# Patient Record
Sex: MALE | Race: WHITE | URBAN - METROPOLITAN AREA
[De-identification: names, ages, dates, MRNs, and addresses within clinical notes are randomized per-mention and may not be internally consistent; named-entity substitution may affect disease eponyms.]

---

## 2021-01-18 ENCOUNTER — HISTORICAL (OUTPATIENT)
Dept: ADMINISTRATIVE | Facility: HOSPITAL | Age: 51
End: 2021-01-18

## 2021-11-21 ENCOUNTER — HOSPITAL ENCOUNTER (OUTPATIENT)
Dept: NUTRITION | Facility: HOSPITAL | Age: 51
End: 2021-11-24
Attending: INTERNAL MEDICINE | Admitting: INTERNAL MEDICINE

## 2022-04-08 ENCOUNTER — HISTORICAL (OUTPATIENT)
Dept: ADMINISTRATIVE | Facility: HOSPITAL | Age: 52
End: 2022-04-08

## 2022-04-09 ENCOUNTER — HISTORICAL (OUTPATIENT)
Dept: ADMINISTRATIVE | Facility: HOSPITAL | Age: 52
End: 2022-04-09
Payer: MEDICAID

## 2022-04-25 VITALS — WEIGHT: 216.5 LBS | BODY MASS INDEX: 33.98 KG/M2 | HEIGHT: 67 IN

## 2022-04-30 NOTE — ED PROVIDER NOTES
Patient:   Jayant Garcia            MRN: [id number]            FIN: [id number]               Age:   50 years     Sex:  Male     :  1970   Associated Diagnoses:   Alcohol withdrawal; Acute epigastric pain   Author:   Gini Magana DO      Basic Information   Time seen: Date & time 2021 17:35:00.   History source: Patient, EMS.   Arrival mode: Ambulance.   History limitation: None.   Additional information: Chief Complaint from Nursing Triage Note : Chief Complaint   2021 17:29 CST     Chief Complaint           acute onset CP. worse with deep breathing and movement. +ETOH  .      History of Present Illness   The patient presents with 51 y/o M presents to the ED via EMS for pleuritic chest pain. EMS states patient had a pint of vodka to drink today, denies drug use. Patient tearful in triage, asking to be placed in rehab center for etoh use. Denies SI/HI, AH/VH. TARYN Jimenez and     I, Dr Magana, assumed care of this pt at 1756.    51 y/o male with a history of alcohol abuse presenting to the ED with epigastric pain and nausea. No vomiting, fever, diarrhea or seizures.  Pt states he does not drink everyday but has been binging lately bc he is getting .. Pt admits that he drank too much and needs to go to rehab. He is concerned he is detoxing since the alcohol is starting to wear off. .  The course/duration of symptoms is constant.  Location: epigastric. Radiating pain: none. The character of symptoms is pleuritic.  The degree at onset was moderate.  The degree at maximum was moderate.  The degree at present is moderate.  The exacerbating factor is none.  The relieving factor is none.  Prior episodes: none.  Therapy today None.  Associated symptoms: none.        Review of Systems   Constitutional symptoms:  No fever, no chills   Skin symptoms:     Respiratory symptoms:  No shortness of breath   Cardiovascular symptoms:  No chest pain, no syncope   Gastrointestinal symptoms:   EpigastricNo nausea, no vomiting   Genitourinary symptoms:               Additional review of systems information: All other systems reviewed and otherwise negative.      Health Status   Allergies:    Allergic Reactions (Selected)  Severity Not Documented  Penicillin- Hives..   Medications:  (Selected)   Inpatient Medications  Ordered  NS (0.9% Sodium Chloride) Infusion: 1,000 mL, 1,000 mL, IV, Once, 1000 mL/hr, start date 11/21/21 18:04:00 CST, stop date 11/21/21 18:04:00 CST, STAT  Protonix 40 mg Vial (IV Push): 40 mg, form: Injection, IV Slow, Once, Infuse over: 2 minute(s), first dose 11/21/21 18:04:00 CST, stop date 11/21/21 18:04:00 CST, STAT  Zofran 2 mg/mL injectable solution: 4 mg, form: Injection, IV Push, Once, first dose 11/21/21 18:04:00 CST, stop date 11/21/21 18:04:00 CST, STAT  Prescriptions  Prescribed  Protonix 40 mg ORAL enteric coated tablet: 40 mg = 1 tab(s), Oral, Daily, # 30 tab(s), 0 Refill(s)  Vistaril 25 mg oral capsule: 25 mg = 1 cap(s), Oral, TID, PRN PRN for anxiety, X 14 day(s), # 30 cap(s), 0 Refill(s)  citalopram 20 mg oral tablet: 20 mg = 1 tab(s), Oral, Daily, # 30 tab(s), 0 Refill(s)  naltrexone 50 mg oral tablet: 50 mg = 1 tab(s), Oral, Daily, X 30 day(s), # 30 tab(s), 0 Refill(s)  Documented Medications  Documented  QUEtiapine 100 mg oral tablet: 100 mg = 1 tab(s), Oral, qPM  buPROPion 300 mg/24 hours (XL) oral tablet, extended release: 300 mg = 1 tab(s), Oral, Daily  ergocalciferol 50,000 intl units (1.25 mg) oral capsule: 75211 IntUnit = 1 cap(s), Oral, qWeek  folic acid 1 mg oral tablet: 1 mg = 1 tab(s), Oral, Daily  hydrOXYzine pamoate 25 mg oral capsule: 25 mg = 1 cap(s), Oral, QID  omeprazole 20 mg oral DR capsule: 20 mg = 1 cap(s), Oral, Daily  prazosin 1 mg oral capsule: 1 mg = 1 cap(s), Oral, qPM  traZODONE 50 mg oral tablet ( Desyrel ): 100 mg = 2 tab(s), Oral, qPM.      Past Medical/ Family/ Social History   Medical history:   Alcohol abuse   Alcohol abuse   Chronic  alcohol abuse   Obesity    .   Surgical history:    Right leg (SNOMED CT [id number])..   Family history:    No family history items have been selected or recorded..   Social history: Alcohol use: Regularly, history of alcohol abuse, Tobacco use: Denies, Drug use: Denies.      Physical Examination               Vital Signs   Vital Signs   11/21/2021 17:45 CST     Peripheral Pulse Rate     110 bpm  HI                             Respiratory Rate          20 br/min                             SpO2                      98 %                             Oxygen Therapy            Room air                             Systolic Blood Pressure   129 mmHg                             Diastolic Blood Pressure  91 mmHg  HI                             Mean Arterial Pressure, Cuff              104 mmHg    11/21/2021 17:29 CST     Temperature Temporal Artery               36.8 DegC  .   Measurements   11/21/2021 17:29 CST     Weight Dosing             100 kg                             Weight Measured and Calculated in Lbs     220.46 lb                             Weight Estimated          100 kg                             Height/Length Dosing      170.18 cm                             Height/Length Estimated   170.18 cm                             Body Mass Index Estimated 34.53 kg/m2  .   Basic Oxygen Information   11/21/2021 17:45 CST     SpO2                      98 %                             Oxygen Therapy            Room air  .   General:  Alert, anxious, Smells of alcohol   Max coma scale:  Eye response: 4 /4, verbal response: 5 /5, motor response: 6 /6, Total score: Total score: 15.    Neurological:  No focal neurological deficit observed, CN II-XII intact, Cognitive function: To person, to place, to situation.    Skin:  Warm, no pallor   Head:  Normocephalic, atraumatic   Neck:  Supple, trachea midline.    Eye:  Pupils are equal, round and reactive to light, extraocular movements are intact, Injected conjunctiva    Ears, nose, mouth and throat:  External ear: Bilateral, normal, Nose: Bilateral nares, normal, Mouth: Dry oral mucosa.    Cardiovascular:  Normal peripheral perfusion, Tachycardic with regular rhythm   Respiratory:  Lungs are clear to auscultation, respirations are non-labored   Gastrointestinal:  Soft, Non distended, Tenderness: Mild, epigastric.    Musculoskeletal:  Normal ROM.   Psychiatric:  Cooperative, appropriate mood & affect.       Medical Decision Making   Documents reviewed:  Emergency department nurses' notes, prior records, Frequent ED visits for alcohol intoxication.    Orders  Launch Order Profile (Selected)   Inpatient Orders  Ordered (Exam Completed)  CXR 1 View: Stat, 21 17:37:00 CST, Shortness of Breath, None, Stretcher, Rad Type, Not Scheduled, 21 17:37:00 CST  Ordered (Exam Ordered)  US Abdomen Limited: Stat, 21 18:49:00 CST, Epigastric Pain, None, Stretcher, Rad Type, Schedule this test, 21 18:49:00 CST  Completed  Automated Diff: STAT collect, 21 17:57:00 CST, Blood, Collected, Stop date 21 17:57:00 CST, Lab Collect, Print Label By Order Location, 21 17:37:00 CST  CBC w/ Auto Diff: STAT collect, 21 17:57:41 CST, BLOOD, Collected, Stop date 21 17:57:00 CST, Lab Collect  CMP: STAT collect, 21 17:57:41 CST, BLOOD, Collected, Stop date 21 17:57:00 CST, Lab Collect  EK21 17:37:00 CST, Stat, Once, 21 17:37:00 CST, -1, -1, 21 17:37:00 CST  Estimated Glomerular Filtration Rate: STAT collect, 21 17:57:00 CST, Blood, Collected, Stop date 21 17:57:00 CST, Lab Collect, Print Label By Order Location, 21 17:37:00 CST  Lipase Level: Stat collect, 21 18:04:00 CST, Blood, Stop date 21 18:04:00 CST, Lab Collect, Print Label By Order Location, 21 18:04:00 CST  NS (0.9% Sodium Chloride) Infusion: 1,000 mL, 1,000 mL, IV, Once, 1000 mL/hr, start date 21 18:04:00 CST, stop date 21  18:04:00 CST, STAT  Protonix 40 mg Vial (IV Push): 40 mg, form: Injection, IV Slow, Once, Infuse over: 2 minute(s), first dose 11/21/21 18:04:00 CST, stop date 11/21/21 18:04:00 CST, STAT  Troponin-I: STAT collect, 11/21/21 17:57:41 CST, BLOOD, Collected, Stop date 11/21/21 17:57:00 CST, Lab Collect  Zofran 2 mg/mL injectable solution: 4 mg, form: Injection, IV Push, Once, first dose 11/21/21 18:04:00 CST, stop date 11/21/21 18:04:00 CST, STAT.   Electrocardiogram:  Time 11/21/2021 17:29:00, rate 123, no ectopy, normal WA & QRS intervals, EP Interp, The Rhythm is sinus tachycardia.  , T wave Inversion, II, , III, , AVF.    Results review:  Lab results : Lab View   11/21/2021 18:33 CST     Est Creat Clearance Ser   97.21 mL/min    11/21/2021 17:57 CST     Sodium Lvl                138 mmol/L                             Potassium Lvl             4.1 mmol/L                             Chloride                  97 mmol/L  LOW                             CO2                       26 mmol/L                             Calcium Lvl               8.5 mg/dL  LOW                             Glucose Lvl               101 mg/dL  HI                             BUN                       14.0 mg/dL                             Creatinine                0.85 mg/dL                             eGFR-AA                   >60  NA                             eGFR-STEVEN                  >60 mL/min/1.73 m2  NA                             Bili Total                1.4 mg/dL                             Bili Direct               0.6 mg/dL  HI                             Bili Indirect             0.80 mg/dL                             AST                       239 unit/L  HI                             ALT                       131 unit/L  HI                             Alk Phos                  92 unit/L                             Total Protein             8.1 gm/dL                             Albumin Lvl               3.9 gm/dL                              Globulin                  4.2 gm/dL  HI                             A/G Ratio                 0.9 ratio  LOW                             Lipase Lvl                88 U/L  HI                             Troponin-I                0.009 ng/mL                             WBC                       10.1 x10(3)/mcL                             RBC                       4.42 x10(6)/mcL  LOW                             Hgb                       13.6 gm/dL  LOW                             Hct                       38.9 %  LOW                             Platelet                  263 x10(3)/mcL                             MCV                       88.0 fL                             MCH                       30.8 pg                             MCHC                      35.0 gm/dL                             RDW                       14.5 %                             MPV                       8.9 fL  LOW                             Abs Neut                  6.48 x10(3)/mcL                             Neutro Auto               64 %  NA                             Lymph Auto                27 %  NA                             Mono Auto                 8 %  NA                             Eos Auto                  0 %  NA                             Abs Eos                   0.0 x10(3)/mcL                             Basophil Auto             1 %  NA                             Abs Neutro                6.48 x10(3)/mcL                             Abs Lymph                 2.8 x10(3)/mcL                             Abs Mono                  0.8 x10(3)/mcL                             Abs Baso                  0.1 x10(3)/mcL  .   Chest X-Ray:  No acute disease process, interpretation by Emergency Physician.    Notes:  50-year-old male presenting with epigastric pain and nausea, admits drinking a large amount.  Labs with mild transaminitis and lipase mildly elevated.  Ultrasound of the right upper quadrant showed enlarged  liver but normal gallbladder wall and normal common bile duct.  Patient was given Zofran and IV fluids.  While in the emergency department patient became more anxious, heart rate became more elevated he became diaphoretic and was concerned for withdrawal. CIWA 12, started on Serax protocol and admitted to hospitalist .      Reexamination/ Reevaluation   Vital signs   Basic Oxygen Information   11/21/2021 17:29 CST     SpO2                      98 %                             Oxygen Therapy            Room air        Impression and Plan   Diagnosis   Alcohol withdrawal ([id number] F10.239)   Acute epigastric pain ([id number] R10.13)   Plan   Disposition: Admit to Inpatient Unit, Day Peck MD    Counseled: Patient, Regarding diagnosis, Regarding diagnostic results, Regarding treatment plan, Patient indicated understanding of instructions.    Notes:   I, Ryder Campbell, acted solely as a scribe for and in the presence of Dr. Magana who performed the service., I, Dr. Magana, personally performed the services described in this documentation as scribed in my presence, and it is both accurate and complete..